# Patient Record
Sex: MALE | Race: WHITE | NOT HISPANIC OR LATINO | Employment: FULL TIME | URBAN - METROPOLITAN AREA
[De-identification: names, ages, dates, MRNs, and addresses within clinical notes are randomized per-mention and may not be internally consistent; named-entity substitution may affect disease eponyms.]

---

## 2023-10-03 ENCOUNTER — APPOINTMENT (OUTPATIENT)
Dept: GENERAL RADIOLOGY | Facility: HOSPITAL | Age: 48
End: 2023-10-03
Payer: MEDICAID

## 2023-10-03 ENCOUNTER — HOSPITAL ENCOUNTER (EMERGENCY)
Facility: HOSPITAL | Age: 48
Discharge: HOME | End: 2023-10-03
Attending: STUDENT IN AN ORGANIZED HEALTH CARE EDUCATION/TRAINING PROGRAM | Admitting: STUDENT IN AN ORGANIZED HEALTH CARE EDUCATION/TRAINING PROGRAM
Payer: MEDICAID

## 2023-10-03 VITALS
SYSTOLIC BLOOD PRESSURE: 113 MMHG | HEART RATE: 86 BPM | RESPIRATION RATE: 18 BRPM | DIASTOLIC BLOOD PRESSURE: 86 MMHG | TEMPERATURE: 97.9 F | OXYGEN SATURATION: 96 %

## 2023-10-03 DIAGNOSIS — F19.90 DRUG USE: Primary | ICD-10-CM

## 2023-10-03 LAB
QT INTERVAL: 425 MS
QTC INTERVAL: 478 MS

## 2023-10-03 PROCEDURE — 71046 X-RAY EXAM CHEST 2 VIEWS: CPT

## 2023-10-03 PROCEDURE — 99283 EMERGENCY DEPT VISIT LOW MDM: CPT

## 2023-10-03 PROCEDURE — 93005 ELECTROCARDIOGRAM TRACING: CPT | Performed by: STUDENT IN AN ORGANIZED HEALTH CARE EDUCATION/TRAINING PROGRAM

## 2023-10-03 PROCEDURE — 93010 ELECTROCARDIOGRAM REPORT: CPT | Performed by: INTERNAL MEDICINE

## 2023-10-03 NOTE — ED PROVIDER NOTES
EMERGENCY DEPARTMENT ENCOUNTER    Room Number:  19/19  PCP: Provider, No Known  Historian: Patient, EMS      HPI:  Chief Complaint: Syncope A complete Context: Jorge Kelly is a 48 y.o. male who presents to the ED c/o syncope.  Patient was found in a bush by neighbors with several inhalant duster cans lying around him.  Patient states he was huffing inhalant to get high.  Patient states he used to this is a kid but has not done it in a while and just wanted to catch a buzz today.  Patient is currently asymptomatic, alert and oriented x4, denies chest pain, shortness of breath.            PAST MEDICAL HISTORY  Active Ambulatory Problems     Diagnosis Date Noted    No Active Ambulatory Problems     Resolved Ambulatory Problems     Diagnosis Date Noted    No Resolved Ambulatory Problems     No Additional Past Medical History         PAST SURGICAL HISTORY  No past surgical history on file.      FAMILY HISTORY  No family history on file.      SOCIAL HISTORY  Social History     Socioeconomic History    Marital status: Single         ALLERGIES  Patient has no known allergies.        REVIEW OF SYSTEMS  Review of Systems   Neurological:  Positive for syncope.   All other systems reviewed and are negative.         PHYSICAL EXAM  ED Triage Vitals [10/03/23 1207]   Temp Heart Rate Resp BP SpO2   97.9 °F (36.6 °C) 70 16 119/81 99 %      Temp src Heart Rate Source Patient Position BP Location FiO2 (%)   -- -- -- -- --       Physical Exam      GENERAL: no acute distress  HENT: nares patent  EYES: no scleral icterus  CV: regular rhythm, normal rate  RESPIRATORY: normal effort  ABDOMEN: soft  MUSCULOSKELETAL: no deformity  NEURO: alert, moves all extremities, follows commands  PSYCH:  calm, cooperative  SKIN: warm, dry    Vital signs and nursing notes reviewed.          LAB RESULTS  Recent Results (from the past 24 hour(s))   ECG 12 Lead Other; inhalent abuse    Collection Time: 10/03/23 12:23 PM   Result Value Ref Range    QT  Interval 425 ms    QTC Interval 478 ms       Ordered the above labs and reviewed the results.        RADIOLOGY  XR Chest 2 View    Result Date: 10/3/2023  EXAM: XR CHEST 2 VW-  COMPARISON: None available  INDICATION: Inhalant exposure.      No focal consolidation. No pleural effusion or pneumothorax.  Normal size cardiomediastinal silhouette.  No focal osseous abnormality.    This report was finalized on 10/3/2023 12:48 PM by Dr. Jonathan Soares M.D on Workstation: [employer]       Ordered the above noted radiological studies. Reviewed by me in PACS.          MEDICATIONS GIVEN IN ER  Medications - No data to display                MEDICAL DECISION MAKING, PROGRESS, and CONSULTS    All labs have been independently reviewed by me.  All radiology studies have been reviewed by me and I have also reviewed the radiology report.   EKG's independently viewed and interpreted by me.  Discussion below represents my analysis of pertinent findings related to patient's condition, differential diagnosis, treatment plan and final disposition.      Additional sources:  - Discussed/ obtained information from independent historians: EMS    - Chronic or social conditions impacting care: Alcohol and substance use    - Shared decision making: Utilizing shared decision-making techniques we discussed options for further evaluation and management.  At this time we elected to pursue outpatient      Orders placed during this visit:  Orders Placed This Encounter   Procedures    XR Chest 2 View    ECG 12 Lead Other; inhalent abuse       Differential diagnosis includes but is not limited to:    Drug overdose, side effect of huffing, syncope      Independent interpretation of labs, radiology studies, and discussions with consultants:  ED Course as of 10/03/23 1503   Tue Oct 03, 2023   1225 EKG interpreted by me demonstrates sinus rhythm, rate of 76, no TN/QT prolongation, no ST elevation [MW]   1259 Chest x-ray interpreted by me demonstrates no  infiltrates or consolidations [MW]   1502 Patient observed for several hours in the emergency department without deterioration or change in condition.  Believe patient symptoms are likely secondary to inhalant use.  Will  patient to follow-up with primary care and cease and appropriate use of duster.  Patient discharged in stable condition with return precautions discussed [MW]      ED Course User Index  [MW] Jonathan Leiva MD           DIAGNOSIS  Final diagnoses:   Drug use         DISPOSITION  DISCHARGE    Patient discharged in stable condition.    Reviewed implications of results, diagnosis, meds, responsibility to follow up, warning signs and symptoms of possible worsening, potential complications and reasons to return to ER, including chest pain, shortness of breath, changes in mental status.    Patient/Family voiced understanding of above instructions.    Discussed plan for discharge, as there is no emergent indication for admission. Patient referred to primary care provider for BP management due to today's BP. Pt/family is agreeable and understands need for follow up and repeat testing.  Pt is aware that discharge does not mean that nothing is wrong but it indicates no emergency is present that requires admission and they must continue care with follow-up as given below or physician of their choice.     FOLLOW-UP  Provider, No Known  [place] [id number]  [phone]    In 1 week           Medication List      No changes were made to your prescriptions during this visit.                   Latest Documented Vital Signs:  As of 15:01 EDT  BP- 119/81 HR- 70 Temp- 97.9 °F (36.6 °C) O2 sat- 99%              --    Please note that portions of this were completed with a voice recognition program.       Note Disclaimer: At [facility], we believe that sharing information builds trust and better relationships. You are receiving this note because you are receiving care at [facility] or recently visited. It is possible you will see health information before a provider has talked with you about it. This kind of information can be easy to misunderstand. To help you fully understand what it means for your health, we urge you to discuss this note with your provider.             Jonathan Leiva MD  10/03/23 1507

## 2023-10-03 NOTE — ED NOTES
Bystanders called for the patient lying down in bushes and had empty cans of duster. A&Ox4. Denies any other drugs or alcohol.

## 2023-10-03 NOTE — ED NOTES
"First contact; bib ems after bystanders called when pt seen down in bushes; pt gcs 15; reports \"huffing pain can like I used to do in high school it was stupid\"; he denies all medical complaints only states \"I'm just hungry\" ; pt gowned and placed on continuous cardiac, SPO2, bp  monitor; VSS @ this time on room air with regular unlabored respirations; pt denies SI or any intent to hurt himself; call light in place, CTM pending MD marlow  "

## 2023-10-03 NOTE — DISCHARGE INSTRUCTIONS
Please follow-up with your primary care physician within 1 week for repeat evaluation and blood pressure check    Please return to the emergency department with new or worsening symptoms including but not limited to changes in mental status, shortness of breath, chest pain

## 2024-09-27 ENCOUNTER — HOSPITAL ENCOUNTER (EMERGENCY)
Facility: HOSPITAL | Age: 49
Discharge: HOME | End: 2024-09-27
Attending: STUDENT IN AN ORGANIZED HEALTH CARE EDUCATION/TRAINING PROGRAM
Payer: MEDICAID

## 2024-09-27 VITALS
HEIGHT: 66 IN | TEMPERATURE: 97.8 F | RESPIRATION RATE: 20 BRPM | SYSTOLIC BLOOD PRESSURE: 130 MMHG | DIASTOLIC BLOOD PRESSURE: 78 MMHG | OXYGEN SATURATION: 97 % | HEART RATE: 115 BPM

## 2024-09-27 DIAGNOSIS — F19.90 DRUG USE: Primary | ICD-10-CM

## 2024-09-27 PROCEDURE — 99283 EMERGENCY DEPT VISIT LOW MDM: CPT

## 2024-09-27 NOTE — DISCHARGE INSTRUCTIONS
Please follow-up with your primary care physician for repeat evaluation.  If you do not have a primary care physician I have included the information for [facility] provider finder, you may contact them and they will help you establish care    Please return to the ER with new or worsening symptoms including but not limited to uncontrolled pain, chest pain, shortness of breath, changes in mental status

## 2024-09-27 NOTE — ED PROVIDER NOTES
EMERGENCY DEPARTMENT ENCOUNTER  Room Number:  24/24  PCP: Provider, No Known  Independent Historians: Patient and EMS      HPI:  Chief Complaint: had concerns including Addiction Problem (Drug intoxication).     Context: Jorge Kelly is a 49 y.o. male with a medical history of homelessness who presents to the ED c/o acute drug intoxication.  Patient was found in the bathroom of a local store huffing keyboard .  Patient's only complaint is that he is cold and hungry.    PAST MEDICAL HISTORY  Active Ambulatory Problems     Diagnosis Date Noted    No Active Ambulatory Problems     Resolved Ambulatory Problems     Diagnosis Date Noted    No Resolved Ambulatory Problems     No Additional Past Medical History         PAST SURGICAL HISTORY  No past surgical history on file.      FAMILY HISTORY  No family history on file.      SOCIAL HISTORY  Social History     Socioeconomic History    Marital status: Single         ALLERGIES  Patient has no known allergies.      REVIEW OF SYSTEMS  Review of Systems  Included in HPI  All systems reviewed and negative except for those discussed in HPI.      PHYSICAL EXAM    I have reviewed the triage vital signs and nursing notes.    ED Triage Vitals   Temp Heart Rate Resp BP SpO2   09/27/24 1355 09/27/24 1355 09/27/24 1355 09/27/24 1355 09/27/24 1355   97.8 °F (36.6 °C) (!) 125 20 149/96 100 %      Temp src Heart Rate Source Patient Position BP Location FiO2 (%)   -- 09/27/24 1429 09/27/24 1432 09/27/24 1432 --    Monitor Lying Left arm        Physical Exam  GENERAL: alert, no acute distress  SKIN: Warm, dry  HENT: Normocephalic, atraumatic  EYES: no scleral icterus  CV: regular rhythm, regular rate  RESPIRATORY: normal effort, lungs clear  ABDOMEN: soft, nontender, nondistended  MUSCULOSKELETAL: no deformity  NEURO: alert, moves all extremities, follows commands            LAB RESULTS  No results found for this or any previous visit (from the past 24 hour(s)).      RADIOLOGY  No  Radiology Exams Resulted Within Past 24 Hours      MEDICATIONS GIVEN IN ER  Medications - No data to display      ORDERS PLACED DURING THIS VISIT:  No orders of the defined types were placed in this encounter.        OUTPATIENT MEDICATION MANAGEMENT:  No current Epic-ordered facility-administered medications on file.     No current [facility]-ordered outpatient medications on file.         PROCEDURES  Procedures            PROGRESS, DATA ANALYSIS, CONSULTS, AND MEDICAL DECISION MAKING  All labs have been independently interpreted by me.  All radiology studies have been reviewed by me. All EKG's have been independently viewed and interpreted by me.  Discussion below represents my analysis of pertinent findings related to patient's condition, differential diagnosis, treatment plan and final disposition.    Differential diagnosis includes but is not limited to hypothermia, intoxication, malingering.    Clinical Scores:                   ED Course as of 09/27/24 1825   Fri Sep 27, 2024   1702 Patient observed in the emergency department for multiple hours without deterioration in condition.  Patient provided multiple warm blankets.  Patient provided multiple juices and glass of water.  Patient fed crackers.  Patient is overall well-appearing with no significant complaints.  Will discharge to follow-up on outpatient basis. [MW]      ED Course User Index  [MW] Jonathan Leiva MD             AS OF 18:25 EDT VITALS:    BP - 130/78  HR - 115  TEMP - 97.8 °F (36.6 °C)  O2 SATS - 97%    COMPLEXITY OF CARE  Admission was considered but after careful review of the patient's presentation, physical examination, diagnostic results, and response to treatment the patient may be safely discharged with outpatient follow-up.      DIAGNOSIS  Final diagnoses:   Drug use         DISPOSITION  ED Disposition       ED Disposition   Discharge    Condition   Stable    Comment   --                Please note that portions of this document were  completed with a voice recognition program.    Note Disclaimer: At [facility], we believe that sharing information builds trust and better relationships. You are receiving this note because you recently visited [facility]. It is possible you will see health information before a provider has talked with you about it. This kind of information can be easy to misunderstand. To help you fully understand what it means for your health, we urge you to discuss this note with your provider.         Jonathan Leiva MD  09/27/24 9526

## 2024-09-27 NOTE — ED NOTES
Patient arrives via [facility] for complaints of drug intoxication. Patient was at [employer] when he was found in the bathroom huffing keyboard . Patient alert and oriented x4.